# Patient Record
Sex: FEMALE | Race: WHITE | HISPANIC OR LATINO | URBAN - METROPOLITAN AREA
[De-identification: names, ages, dates, MRNs, and addresses within clinical notes are randomized per-mention and may not be internally consistent; named-entity substitution may affect disease eponyms.]

---

## 2020-07-25 ENCOUNTER — TELEPHONE ENCOUNTER (OUTPATIENT)
Dept: URBAN - METROPOLITAN AREA CLINIC 13 | Facility: CLINIC | Age: 25
End: 2020-07-25

## 2020-07-25 RX ORDER — POLYETHYLENE GLYCOL 3350, SODIUM CHLORIDE, SODIUM BICARBONATE AND POTASSIUM CHLORIDE WITH LEMON FLAVOR 420; 11.2; 5.72; 1.48 G/4L; G/4L; G/4L; G/4L
DRINK 32 OUNCES AT 5PM DAY BEFORE PROCEDURE AND 6 HOURS PRIOR POWDER, FOR SOLUTION ORAL
Qty: 1 | Refills: 0 | OUTPATIENT
Start: 2014-05-09 | End: 2016-03-17

## 2020-07-26 ENCOUNTER — TELEPHONE ENCOUNTER (OUTPATIENT)
Dept: URBAN - METROPOLITAN AREA CLINIC 13 | Facility: CLINIC | Age: 25
End: 2020-07-26

## 2020-07-26 RX ORDER — MESALAMINE 800 MG/1
TAKE 2 TABLET 3 TIMES DAILY TABLET, DELAYED RELEASE ORAL
Refills: 0 | Status: ACTIVE | COMMUNITY

## 2020-07-26 RX ORDER — BUDESONIDE 9 MG/1
TAKE 1 TABLET DAILY TABLET, EXTENDED RELEASE ORAL
Qty: 30 | Refills: 2 | Status: ACTIVE | COMMUNITY
Start: 2016-03-17

## 2023-01-03 ENCOUNTER — OFFICE VISIT (OUTPATIENT)
Dept: URBAN - METROPOLITAN AREA CLINIC 105 | Facility: CLINIC | Age: 28
End: 2023-01-03
Payer: COMMERCIAL

## 2023-01-03 ENCOUNTER — LAB OUTSIDE AN ENCOUNTER (OUTPATIENT)
Dept: URBAN - METROPOLITAN AREA CLINIC 105 | Facility: CLINIC | Age: 28
End: 2023-01-03

## 2023-01-03 ENCOUNTER — WEB ENCOUNTER (OUTPATIENT)
Dept: URBAN - METROPOLITAN AREA CLINIC 105 | Facility: CLINIC | Age: 28
End: 2023-01-03

## 2023-01-03 VITALS
HEIGHT: 64 IN | BODY MASS INDEX: 50.02 KG/M2 | SYSTOLIC BLOOD PRESSURE: 105 MMHG | TEMPERATURE: 97.3 F | WEIGHT: 293 LBS | HEART RATE: 88 BPM | DIASTOLIC BLOOD PRESSURE: 69 MMHG

## 2023-01-03 DIAGNOSIS — K52.9 IBD (INFLAMMATORY BOWEL DISEASE): ICD-10-CM

## 2023-01-03 DIAGNOSIS — R79.89 ABNORMAL LFTS: ICD-10-CM

## 2023-01-03 DIAGNOSIS — K76.0 FATTY LIVER: ICD-10-CM

## 2023-01-03 DIAGNOSIS — K51.018 ULCERATIVE PANCOLITIS WITH OTHER COMPLICATION: ICD-10-CM

## 2023-01-03 PROBLEM — 444548001: Status: ACTIVE | Noted: 2023-01-03

## 2023-01-03 PROCEDURE — 99243 OFF/OP CNSLTJ NEW/EST LOW 30: CPT | Performed by: INTERNAL MEDICINE

## 2023-01-03 PROCEDURE — 99203 OFFICE O/P NEW LOW 30 MIN: CPT | Performed by: INTERNAL MEDICINE

## 2023-01-03 RX ORDER — MESALAMINE 800 MG/1
TAKE 2 TABLET 3 TIMES DAILY TABLET, DELAYED RELEASE ORAL
Refills: 0 | Status: DISCONTINUED | COMMUNITY

## 2023-01-03 RX ORDER — SERTRALINE HYDROCHLORIDE 150 MG/1
1 CAPSULE CAPSULE ORAL ONCE A DAY
Status: ACTIVE | COMMUNITY

## 2023-01-03 RX ORDER — AMPHETAMINE 10 MG/1
1 TABLET IN THE MORNING TABLET, EXTENDED RELEASE ORAL ONCE A DAY
Status: ACTIVE | COMMUNITY

## 2023-01-03 RX ORDER — BUDESONIDE 9 MG/1
TAKE 1 TABLET DAILY TABLET, EXTENDED RELEASE ORAL
Qty: 30 | Refills: 2 | Status: DISCONTINUED | COMMUNITY
Start: 2016-03-17

## 2023-01-03 RX ORDER — MESALAMINE 1.2 G/1
2 TABLETS WITH A MEAL TABLET, DELAYED RELEASE ORAL ONCE A DAY
Status: ACTIVE | COMMUNITY

## 2023-01-03 NOTE — HPI-TODAY'S VISIT:
patient comes in referral from Dr. DIMPLE Yi.  A copy of the consultation will be sent to the referring doctor. patient reports being diagnosed with Crohn's colitis in December of 2014 when she presented with bloody bowel movements and abdominal cramping. pt is here to establish care. overall does pretty good. she had colonoscopy in July odf 2022 in [facility]. she reports that she has some fatty livwer

## 2023-01-12 ENCOUNTER — TELEPHONE ENCOUNTER (OUTPATIENT)
Dept: URBAN - METROPOLITAN AREA CLINIC 105 | Facility: CLINIC | Age: 28
End: 2023-01-12

## 2023-01-13 ENCOUNTER — TELEPHONE ENCOUNTER (OUTPATIENT)
Dept: URBAN - METROPOLITAN AREA CLINIC 105 | Facility: CLINIC | Age: 28
End: 2023-01-13

## 2023-01-13 LAB
A/G RATIO: 1.2
ACTIN (SMOOTH MUSCLE) ANTIBODY (IGG): 34
ALBUMIN: 4.2
ALKALINE PHOSPHATASE: 68
ALPHA-1-ANTITRYPSIN (AAT) PHENOTYPE: (no result)
ALT (SGPT): 61
ANA SCREEN, IFA: POSITIVE
AST (SGOT): 51
BILIRUBIN, TOTAL: 0.4
BUN/CREATININE RATIO: 28
BUN: 13
CALCIUM: 9.1
CARBON DIOXIDE, TOTAL: 27
CERULOPLASMIN: 33
CHLORIDE: 105
CREATININE: 0.46
EGFR: 133
GLOBULIN, TOTAL: 3.5
GLUCOSE: 88
HEMATOCRIT: 39.3
HEMOGLOBIN: 13.3
HEPATITIS B CORE AB TOTAL: (no result)
HEPATITIS B SURFACE AB IMMUNITY, QN: 515
HEPATITIS B SURFACE ANTIGEN: (no result)
HEPATITIS C ANTIBODY: (no result)
INDEX: 0.09
LKM-1 ANTIBODY (IGG): <=20
MCH: 28.7
MCHC: 33.8
MCV: 84.7
MITOCHONDRIAL (M2) ANTIBODY: <=20
MPV: 9.4
PLATELET COUNT: 340
POTASSIUM: 4.3
PROTEIN, TOTAL: 7.7
RDW: 13.9
RED BLOOD CELL COUNT: 4.64
SODIUM: 138
WHITE BLOOD CELL COUNT: 8.3

## 2023-01-31 ENCOUNTER — OFFICE VISIT (OUTPATIENT)
Dept: URBAN - METROPOLITAN AREA CLINIC 105 | Facility: CLINIC | Age: 28
End: 2023-01-31
Payer: COMMERCIAL

## 2023-01-31 VITALS
BODY MASS INDEX: 50.02 KG/M2 | TEMPERATURE: 97.1 F | HEIGHT: 64 IN | HEART RATE: 93 BPM | WEIGHT: 293 LBS | SYSTOLIC BLOOD PRESSURE: 111 MMHG | DIASTOLIC BLOOD PRESSURE: 77 MMHG

## 2023-01-31 DIAGNOSIS — F90.9 ATTENTION DEFICIT HYPERACTIVITY DISORDER (ADHD), UNSPECIFIED ADHD TYPE: ICD-10-CM

## 2023-01-31 DIAGNOSIS — E66.01 MORBID OBESITY: ICD-10-CM

## 2023-01-31 DIAGNOSIS — R79.89 ABNORMAL LFTS: ICD-10-CM

## 2023-01-31 DIAGNOSIS — K76.0 FATTY LIVER: ICD-10-CM

## 2023-01-31 DIAGNOSIS — K75.4 AUTOIMMUNE HEPATITIS: ICD-10-CM

## 2023-01-31 PROBLEM — 408335007: Status: ACTIVE | Noted: 2023-01-31

## 2023-01-31 PROBLEM — 197321007: Status: ACTIVE | Noted: 2023-01-03

## 2023-01-31 PROBLEM — 406506008: Status: ACTIVE | Noted: 2023-01-31

## 2023-01-31 PROBLEM — 238136002: Status: ACTIVE | Noted: 2023-01-31

## 2023-01-31 PROCEDURE — 99214 OFFICE O/P EST MOD 30 MIN: CPT | Performed by: INTERNAL MEDICINE

## 2023-01-31 RX ORDER — AMPHETAMINE 10 MG/1
1 TABLET IN THE MORNING TABLET, EXTENDED RELEASE ORAL ONCE A DAY
Status: ACTIVE | COMMUNITY

## 2023-01-31 RX ORDER — SERTRALINE HYDROCHLORIDE 150 MG/1
1 CAPSULE CAPSULE ORAL ONCE A DAY
Status: ACTIVE | COMMUNITY

## 2023-01-31 RX ORDER — MESALAMINE 1.2 G/1
2 TABLETS WITH A MEAL TABLET, DELAYED RELEASE ORAL ONCE A DAY
Status: ACTIVE | COMMUNITY

## 2023-01-31 NOTE — HPI-TODAY'S VISIT:
patient comes in referral from Dr. DIMPLE Yi.  A copy of the consultation will be sent to the referring doctor. patient reports being diagnosed with Crohn's colitis in December of 2014 when she presented with bloody bowel movements and abdominal cramping. pt is here to establish care. overall does pretty good. she had colonoscopy in July odf 2022 in [facility]. she reports that she has some fatty liver - 1/31/2023: pt reports two trips to the Urgent Care and one visit to the ER since we last visit. earlier this month she had a R sided headache and then in ER at [facility] for it and had a CT head reportedly negative per pt. following up with a Neurologist about that.  - I was able to review many many records from her previous GI work up and hepatology work up. while i was not provided the exact liver bx report, her prior gastroenterologist referrs to the results in his progress notes that there were features of AIH. she was actually treated for that with entocort. the liver biopsy was in march of 2022. - she has been seeing an acupuncturist and a chiropracter.

## 2023-03-01 ENCOUNTER — OFFICE VISIT (OUTPATIENT)
Dept: URBAN - METROPOLITAN AREA CLINIC 105 | Facility: CLINIC | Age: 28
End: 2023-03-01

## 2023-07-07 ENCOUNTER — WEB ENCOUNTER (OUTPATIENT)
Dept: URBAN - METROPOLITAN AREA CLINIC 105 | Facility: CLINIC | Age: 28
End: 2023-07-07

## 2023-07-17 ENCOUNTER — LAB OUTSIDE AN ENCOUNTER (OUTPATIENT)
Dept: URBAN - METROPOLITAN AREA CLINIC 105 | Facility: CLINIC | Age: 28
End: 2023-07-17

## 2023-07-20 ENCOUNTER — OFFICE VISIT (OUTPATIENT)
Dept: URBAN - METROPOLITAN AREA CLINIC 105 | Facility: CLINIC | Age: 28
End: 2023-07-20

## 2023-07-24 ENCOUNTER — WEB ENCOUNTER (OUTPATIENT)
Dept: URBAN - METROPOLITAN AREA CLINIC 105 | Facility: CLINIC | Age: 28
End: 2023-07-24

## 2023-08-15 ENCOUNTER — OFFICE VISIT (OUTPATIENT)
Dept: URBAN - METROPOLITAN AREA CLINIC 105 | Facility: CLINIC | Age: 28
End: 2023-08-15
Payer: COMMERCIAL

## 2023-08-15 VITALS
SYSTOLIC BLOOD PRESSURE: 108 MMHG | WEIGHT: 270.4 LBS | TEMPERATURE: 97.3 F | DIASTOLIC BLOOD PRESSURE: 73 MMHG | BODY MASS INDEX: 46.16 KG/M2 | HEART RATE: 94 BPM | HEIGHT: 64 IN

## 2023-08-15 DIAGNOSIS — K51.00 ULCERATIVE CHRONIC PANCOLITIS WITHOUT COMPLICATIONS: ICD-10-CM

## 2023-08-15 DIAGNOSIS — K76.0 FATTY LIVER: ICD-10-CM

## 2023-08-15 DIAGNOSIS — E66.3 OVERWEIGHT: ICD-10-CM

## 2023-08-15 DIAGNOSIS — R79.89 ABNORMAL LFTS: ICD-10-CM

## 2023-08-15 DIAGNOSIS — K52.9 IBD (INFLAMMATORY BOWEL DISEASE): ICD-10-CM

## 2023-08-15 DIAGNOSIS — K52.89 (LYMPHOCYTIC) MICROSCOPIC COLITIS: ICD-10-CM

## 2023-08-15 PROBLEM — 442191002: Status: ACTIVE | Noted: 2023-08-15

## 2023-08-15 PROBLEM — 442159003: Status: ACTIVE | Noted: 2023-08-15

## 2023-08-15 PROCEDURE — 99214 OFFICE O/P EST MOD 30 MIN: CPT | Performed by: INTERNAL MEDICINE

## 2023-08-15 RX ORDER — SERTRALINE HYDROCHLORIDE 150 MG/1
1 CAPSULE CAPSULE ORAL ONCE A DAY
Status: ON HOLD | COMMUNITY

## 2023-08-15 RX ORDER — SERTRALINE 100 MG/1
1 TABLET TABLET, FILM COATED ORAL
Status: ACTIVE | COMMUNITY
Start: 2023-08-15

## 2023-08-15 RX ORDER — AMPHETAMINE 10 MG/1
1 TABLET IN THE MORNING TABLET, EXTENDED RELEASE ORAL ONCE A DAY
Status: ACTIVE | COMMUNITY

## 2023-08-15 RX ORDER — MESALAMINE 1.2 G/1
2 TABLETS WITH A MEAL TABLET, DELAYED RELEASE ORAL ONCE A DAY
Status: ACTIVE | COMMUNITY

## 2023-08-15 RX ORDER — BISACODYL 5 MG
TAKE 4 TABLET, DELAYED RELEASE (ENTERIC COATED) ORAL
Qty: 4 | OUTPATIENT
Start: 2023-08-15 | End: 2023-08-16

## 2023-08-15 RX ORDER — SACCHAROMYCES BOULARDII 250 MG
AS DIRECTED CAPSULE ORAL
Status: ACTIVE | COMMUNITY

## 2023-08-15 RX ORDER — SODIUM, POTASSIUM,MAG SULFATES 17.5-3.13G
177 ML SOLUTION, RECONSTITUTED, ORAL ORAL
Qty: 1 KIT | Refills: 0 | OUTPATIENT
Start: 2023-08-15 | End: 2023-08-16

## 2023-08-15 NOTE — HPI-TODAY'S VISIT:
patient comes in referral from Dr. DIMPLE Yi.  A copy of the consultation will be sent to the referring doctor. patient reports being diagnosed with Crohn's colitis in December of 2014 when she presented with bloody bowel movements and abdominal cramping. pt is here to establish care. overall does pretty good. she had colonoscopy in July odf 2022 in [facility]. she reports that she has some fatty liver - 1/31/2023: pt reports two trips to the Urgent Care and one visit to the ER since we last visit. earlier this month she had a R sided headache and then in ER at [facility] for it and had a CT head reportedly negative per pt. following up with a Neurologist about that.  - I was able to review many many records from her previous GI work up and hepatology work up. while i was not provided the exact liver bx report, her prior gastroenterologist referrs to the results in his progress notes that there were features of AIH. she was actually treated for that with entocort. the liver biopsy was in march of 2022. - she has been seeing an acupuncturist and a chiropracter. - 8/15/2023: pt has prior liver bx that showed stage 3 fibrosis in 2022. recent fibro scan suggested stage 1 fibrosis. severe fatty liver. she reports having ulcerative colitis and being on mesalamine.

## 2023-09-08 ENCOUNTER — OUT OF OFFICE VISIT (OUTPATIENT)
Dept: URBAN - METROPOLITAN AREA SURGERY CENTER 16 | Facility: SURGERY CENTER | Age: 28
End: 2023-09-08
Payer: COMMERCIAL

## 2023-09-08 ENCOUNTER — CLAIMS CREATED FROM THE CLAIM WINDOW (OUTPATIENT)
Dept: URBAN - METROPOLITAN AREA CLINIC 4 | Facility: CLINIC | Age: 28
End: 2023-09-08
Payer: COMMERCIAL

## 2023-09-08 DIAGNOSIS — K63.89 OTHER SPECIFIED DISEASES OF INTESTINE: ICD-10-CM

## 2023-09-08 DIAGNOSIS — K64.8 OTHER HEMORRHOIDS: ICD-10-CM

## 2023-09-08 DIAGNOSIS — K31.89 OTHER DISEASES OF STOMACH AND DUODENUM: ICD-10-CM

## 2023-09-08 DIAGNOSIS — K51.80 CHRONIC PANCOLONIC ULCERATIVE COLITIS: ICD-10-CM

## 2023-09-08 DIAGNOSIS — K62.89 OTHER SPECIFIED DISEASES OF ANUS AND RECTUM: ICD-10-CM

## 2023-09-08 DIAGNOSIS — Z87.19 HISTORY OF ULCERATIVE COLITIS: ICD-10-CM

## 2023-09-08 PROCEDURE — 45385 COLONOSCOPY W/LESION REMOVAL: CPT | Performed by: INTERNAL MEDICINE

## 2023-09-08 PROCEDURE — 88305 TISSUE EXAM BY PATHOLOGIST: CPT | Performed by: PATHOLOGY

## 2023-09-08 PROCEDURE — 00811 ANES LWR INTST NDSC NOS: CPT | Performed by: STUDENT IN AN ORGANIZED HEALTH CARE EDUCATION/TRAINING PROGRAM

## 2023-09-08 PROCEDURE — G8907 PT DOC NO EVENTS ON DISCHARG: HCPCS | Performed by: INTERNAL MEDICINE

## 2023-09-08 PROCEDURE — 45380 COLONOSCOPY AND BIOPSY: CPT | Performed by: INTERNAL MEDICINE

## 2023-09-08 PROCEDURE — 00811 ANES LWR INTST NDSC NOS: CPT | Performed by: ANESTHESIOLOGY

## 2024-02-13 ENCOUNTER — OV EP (OUTPATIENT)
Dept: URBAN - METROPOLITAN AREA CLINIC 105 | Facility: CLINIC | Age: 29
End: 2024-02-13
Payer: COMMERCIAL

## 2024-02-13 VITALS
HEART RATE: 96 BPM | DIASTOLIC BLOOD PRESSURE: 72 MMHG | WEIGHT: 286 LBS | SYSTOLIC BLOOD PRESSURE: 103 MMHG | HEIGHT: 64 IN | BODY MASS INDEX: 48.83 KG/M2 | TEMPERATURE: 96.9 F

## 2024-02-13 DIAGNOSIS — K76.0 FATTY LIVER: ICD-10-CM

## 2024-02-13 DIAGNOSIS — Z87.19 HISTORY OF ULCERATIVE COLITIS: ICD-10-CM

## 2024-02-13 DIAGNOSIS — K75.4 AUTOIMMUNE HEPATITIS: ICD-10-CM

## 2024-02-13 DIAGNOSIS — K75.81 STEATOHEPATITIS: ICD-10-CM

## 2024-02-13 PROBLEM — 78275009: Status: ACTIVE | Noted: 2024-02-13

## 2024-02-13 PROBLEM — 428529004: Status: ACTIVE | Noted: 2024-02-13

## 2024-02-13 PROCEDURE — 99214 OFFICE O/P EST MOD 30 MIN: CPT | Performed by: INTERNAL MEDICINE

## 2024-02-13 RX ORDER — SACCHAROMYCES BOULARDII 250 MG
AS DIRECTED CAPSULE ORAL
Status: ACTIVE | COMMUNITY

## 2024-02-13 RX ORDER — MESALAMINE 1.2 G/1
2 TABLETS WITH A MEAL TABLET, DELAYED RELEASE ORAL ONCE A DAY
Status: ACTIVE | COMMUNITY

## 2024-02-13 RX ORDER — SERTRALINE 100 MG/1
2 TABLETS TABLET, FILM COATED ORAL ONCE A DAY
Status: ACTIVE | COMMUNITY
Start: 2023-08-15

## 2024-02-13 RX ORDER — AMPHETAMINE 20 MG/1
1 TABLET IN THE MORNING TABLET, EXTENDED RELEASE ORAL ONCE A DAY
Refills: 0 | Status: ACTIVE | COMMUNITY

## 2024-02-13 RX ORDER — SERTRALINE HYDROCHLORIDE 150 MG/1
1 CAPSULE CAPSULE ORAL ONCE A DAY
Status: ON HOLD | COMMUNITY

## 2024-02-13 NOTE — HPI-TODAY'S VISIT:
patient comes in referral from Dr. DIMPLE Yi.  A copy of the consultation will be sent to the referring doctor. patient reports being diagnosed with Crohn's colitis in December of 2014 when she presented with bloody bowel movements and abdominal cramping. pt is here to establish care. overall does pretty good. she had colonoscopy in July odf 2022 in [facility]. she reports that she has some fatty liver - 1/31/2023: pt reports two trips to the Urgent Care and one visit to the ER since we last visit. earlier this month she had a R sided headache and then in ER at [facility] for it and had a CT head reportedly negative per pt. following up with a Neurologist about that.  - I was able to review many many records from her previous GI work up and hepatology work up. while i was not provided the exact liver bx report, her prior gastroenterologist referrs to the results in his progress notes that there were features of AIH. she was actually treated for that with entocort. the liver biopsy was in march of 2022. - she has been seeing an acupuncturist and a chiropracter. - 8/15/2023: pt has prior liver bx that showed stage 3 fibrosis in 2022. recent fibro scan suggested stage 1 fibrosis. severe fatty liver. she reports having ulcerative colitis and being on mesalamine. -   02/12/2024: Patient was last seen in September of 2023 when we did her colonoscopy for reported history of chronic ulcerative colitis.  The colonoscopy was visually and histopathologically normal with no evidence of inflammation or even quiescent colitis.  The patient continues on Lialda 2 tablets daily.  I also reviewed again today her liver biopsy that was done in 2022.  The patient had 80% hepatic steatosis consistent with fatty liver disease and there is also suggestion of a component of autoimmune hepatitis.  She had a strongly positive TIFF titer.  The patient tells me that she was treated for this with budesonide in the past and it did not improve her liver enzymes but ran her blood sugar up and caused her to have worsening right upper quadrant pain and she will never take this medicine again. 270 -> 286.  Has gained 16 pounds since we last visited.  she had a double mastectomy at [place] in 9/2023. - pt shows me labs from 12/2023.   AST/ALT were 43/51. alk phos normal bili normal. GGT 63.

## 2024-08-13 ENCOUNTER — OFFICE VISIT (OUTPATIENT)
Dept: URBAN - METROPOLITAN AREA CLINIC 105 | Facility: CLINIC | Age: 29
End: 2024-08-13